# Patient Record
Sex: MALE | Race: WHITE | NOT HISPANIC OR LATINO
[De-identification: names, ages, dates, MRNs, and addresses within clinical notes are randomized per-mention and may not be internally consistent; named-entity substitution may affect disease eponyms.]

---

## 2018-07-27 PROBLEM — Z00.00 ENCOUNTER FOR PREVENTIVE HEALTH EXAMINATION: Status: ACTIVE | Noted: 2018-07-27

## 2018-07-30 ENCOUNTER — APPOINTMENT (OUTPATIENT)
Dept: CARDIOLOGY | Facility: CLINIC | Age: 58
End: 2018-07-30

## 2018-07-30 ENCOUNTER — NON-APPOINTMENT (OUTPATIENT)
Age: 58
End: 2018-07-30

## 2018-07-30 VITALS
DIASTOLIC BLOOD PRESSURE: 73 MMHG | OXYGEN SATURATION: 94 % | SYSTOLIC BLOOD PRESSURE: 115 MMHG | WEIGHT: 218 LBS | HEART RATE: 78 BPM | TEMPERATURE: 97.8 F | HEIGHT: 69 IN | RESPIRATION RATE: 12 BRPM | BODY MASS INDEX: 32.29 KG/M2

## 2018-07-30 DIAGNOSIS — G90.2 HORNER'S SYNDROME: ICD-10-CM

## 2018-07-30 DIAGNOSIS — Z82.49 FAMILY HISTORY OF ISCHEMIC HEART DISEASE AND OTHER DISEASES OF THE CIRCULATORY SYSTEM: ICD-10-CM

## 2018-07-30 DIAGNOSIS — Z87.09 PERSONAL HISTORY OF OTHER DISEASES OF THE RESPIRATORY SYSTEM: ICD-10-CM

## 2018-07-30 DIAGNOSIS — Z78.9 OTHER SPECIFIED HEALTH STATUS: ICD-10-CM

## 2018-07-30 DIAGNOSIS — Z87.438 PERSONAL HISTORY OF OTHER DISEASES OF MALE GENITAL ORGANS: ICD-10-CM

## 2018-07-30 DIAGNOSIS — G58.8 OTHER SPECIFIED MONONEUROPATHIES: ICD-10-CM

## 2018-07-30 RX ORDER — SILDENAFIL CITRATE 100 MG/1
TABLET, FILM COATED ORAL
Refills: 0 | Status: ACTIVE | COMMUNITY

## 2018-07-30 RX ORDER — ALBUTEROL SULFATE 90 UG/1
108 AEROSOL, METERED RESPIRATORY (INHALATION)
Refills: 0 | Status: ACTIVE | COMMUNITY

## 2018-08-07 ENCOUNTER — APPOINTMENT (OUTPATIENT)
Dept: CARDIOLOGY | Facility: CLINIC | Age: 58
End: 2018-08-07

## 2018-08-08 ENCOUNTER — MESSAGE (OUTPATIENT)
Age: 58
End: 2018-08-08

## 2018-08-17 ENCOUNTER — MESSAGE (OUTPATIENT)
Age: 58
End: 2018-08-17

## 2018-08-21 ENCOUNTER — APPOINTMENT (OUTPATIENT)
Dept: CARDIOLOGY | Facility: CLINIC | Age: 58
End: 2018-08-21

## 2018-09-20 ENCOUNTER — MESSAGE (OUTPATIENT)
Age: 58
End: 2018-09-20

## 2019-09-01 ENCOUNTER — HOSPITAL ENCOUNTER (EMERGENCY)
Dept: HOSPITAL 74 - JER | Age: 59
Discharge: HOME | End: 2019-09-01
Payer: COMMERCIAL

## 2019-09-01 ENCOUNTER — HOSPITAL ENCOUNTER (INPATIENT)
Dept: HOSPITAL 74 - YASAS | Age: 59
LOS: 5 days | Discharge: HOME | End: 2019-09-06
Attending: SURGERY | Admitting: SURGERY
Payer: COMMERCIAL

## 2019-09-01 VITALS — HEART RATE: 85 BPM | SYSTOLIC BLOOD PRESSURE: 136 MMHG | DIASTOLIC BLOOD PRESSURE: 77 MMHG | TEMPERATURE: 98.3 F

## 2019-09-01 VITALS — BODY MASS INDEX: 32.5 KG/M2

## 2019-09-01 DIAGNOSIS — S49.82XA: ICD-10-CM

## 2019-09-01 DIAGNOSIS — F19.24: ICD-10-CM

## 2019-09-01 DIAGNOSIS — H54.62: ICD-10-CM

## 2019-09-01 DIAGNOSIS — F10.120: Primary | ICD-10-CM

## 2019-09-01 DIAGNOSIS — J45.20: ICD-10-CM

## 2019-09-01 DIAGNOSIS — Z98.890: ICD-10-CM

## 2019-09-01 DIAGNOSIS — J45.909: ICD-10-CM

## 2019-09-01 DIAGNOSIS — Z91.19: ICD-10-CM

## 2019-09-01 DIAGNOSIS — F10.230: Primary | ICD-10-CM

## 2019-09-01 DIAGNOSIS — F13.10: ICD-10-CM

## 2019-09-01 DIAGNOSIS — F31.9: ICD-10-CM
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
## 2019-09-01 DIAGNOSIS — F17.210: ICD-10-CM

## 2019-09-01 DIAGNOSIS — J44.9: ICD-10-CM

## 2019-09-01 DIAGNOSIS — F41.9: ICD-10-CM

## 2019-09-01 DIAGNOSIS — G90.2: ICD-10-CM

## 2019-09-01 DIAGNOSIS — I10: ICD-10-CM

## 2019-09-01 DIAGNOSIS — G47.00: ICD-10-CM

## 2019-09-01 DIAGNOSIS — Z72.0: ICD-10-CM

## 2019-09-01 DIAGNOSIS — E78.00: ICD-10-CM

## 2019-09-01 LAB
ALBUMIN SERPL-MCNC: 4 G/DL (ref 3.4–5)
ALP SERPL-CCNC: 99 U/L (ref 45–117)
ALT SERPL-CCNC: 77 U/L (ref 13–61)
AMPHET UR-MCNC: NEGATIVE NG/ML
ANION GAP SERPL CALC-SCNC: 7 MMOL/L (ref 8–16)
AST SERPL-CCNC: 58 U/L (ref 15–37)
BACTERIA # UR AUTO: 1 /HPF
BARBITURATES UR-MCNC: NEGATIVE NG/ML
BASOPHILS # BLD: 0.6 % (ref 0–2)
BENZODIAZ UR SCN-MCNC: POSITIVE NG/ML
BILIRUB SERPL-MCNC: 0.4 MG/DL (ref 0.2–1)
BUN SERPL-MCNC: 18.1 MG/DL (ref 7–18)
CALCIUM SERPL-MCNC: 9.2 MG/DL (ref 8.5–10.1)
CASTS URNS QL MICRO: 5.25 /LPF (ref 0–8)
CHLORIDE SERPL-SCNC: 106 MMOL/L (ref 98–107)
CO2 SERPL-SCNC: 29 MMOL/L (ref 21–32)
COCAINE UR-MCNC: NEGATIVE NG/ML
CREAT SERPL-MCNC: 0.8 MG/DL (ref 0.55–1.3)
CRYSTALS URNS QL MICRO: (no result) /HPF
DEPRECATED RDW RBC AUTO: 14.8 % (ref 11.9–15.9)
EOSINOPHIL # BLD: 1.7 % (ref 0–4.5)
EPITH CASTS URNS QL MICRO: 1.1 /HPF
GLUCOSE SERPL-MCNC: 166 MG/DL (ref 74–106)
HCT VFR BLD CALC: 50.7 % (ref 35.4–49)
HGB BLD-MCNC: 17.2 GM/DL (ref 11.7–16.9)
LYMPHOCYTES # BLD: 17 % (ref 8–40)
MCH RBC QN AUTO: 32.2 PG (ref 25.7–33.7)
MCHC RBC AUTO-ENTMCNC: 33.9 G/DL (ref 32–35.9)
MCV RBC: 94.9 FL (ref 80–96)
METHADONE UR-MCNC: NEGATIVE NG/ML
MONOCYTES # BLD AUTO: 10.6 % (ref 3.8–10.2)
NEUTROPHILS # BLD: 70.1 % (ref 42.8–82.8)
OPIATES UR QL SCN: NEGATIVE NG/ML
PCP UR QL SCN: NEGATIVE NG/ML
PH UR: 6 [PH] (ref 5–8)
PLATELET # BLD AUTO: 201 K/MM3 (ref 134–434)
PMV BLD: 9 FL (ref 7.5–11.1)
POTASSIUM SERPLBLD-SCNC: 4.3 MMOL/L (ref 3.5–5.1)
PROT SERPL-MCNC: 7.5 G/DL (ref 6.4–8.2)
PROT UR QL STRIP: (no result)
RBC # BLD AUTO: 10.6 /HPF (ref 0–4)
RBC # BLD AUTO: 5.34 M/MM3 (ref 4–5.6)
SODIUM SERPL-SCNC: 142 MMOL/L (ref 136–145)
SP GR UR: 1.02 (ref 1.01–1.03)
UROBILINOGEN UR STRIP-MCNC: 1 MG/DL (ref 0.2–1)
WBC # BLD AUTO: 8.8 K/MM3 (ref 4–10)
WBC # UR AUTO: 0.4 /HPF (ref 0–5)

## 2019-09-01 PROCEDURE — HZ2ZZZZ DETOXIFICATION SERVICES FOR SUBSTANCE ABUSE TREATMENT: ICD-10-PCS | Performed by: ALLERGY & IMMUNOLOGY

## 2019-09-01 PROCEDURE — 3E0337Z INTRODUCTION OF ELECTROLYTIC AND WATER BALANCE SUBSTANCE INTO PERIPHERAL VEIN, PERCUTANEOUS APPROACH: ICD-10-PCS | Performed by: EMERGENCY MEDICINE

## 2019-09-01 RX ADMIN — IPRATROPIUM BROMIDE AND ALBUTEROL SULFATE PRN AMP: .5; 3 SOLUTION RESPIRATORY (INHALATION) at 23:07

## 2019-09-01 RX ADMIN — ALBUTEROL SULFATE PRN PUFF: 90 AEROSOL, METERED RESPIRATORY (INHALATION) at 22:12

## 2019-09-01 RX ADMIN — Medication SCH MG: at 22:13

## 2019-09-01 RX ADMIN — Medication PRN MG: at 22:13

## 2019-09-01 NOTE — HP
CIWA Score


Nausea/Vomitin-Mild Nausea/No Vomiting


Muscle Tremors: 2


Anxiety: 3


Agitation: 3


Paroxysmal Sweats: 1-Minimal Palms Moist


Orientation: 0-Oriented


Tacttile Disturbances: 1-Very Mild Itch/Numbness


Auditory Disturbances: 0-None


Visual Disturbances: 0-None


Headache: 2-Mild


CIWA-Ar Total Score: 13





- Admission Criteria


[facility] Guidelines: Admission for Medically Managed Detox: 


Requires at least one of the followin. CIWA greater than 12


2. Seizures within the past 24 hours


3. Delirium tremens within the past 24 hours


4. Hallucinations within the past 24 hours


5. Acute intervention needed for co  occurring medical disorder


6. Acute intervention needed for co  occurring psychiatric disorder


7. Severe withdrawal that cannot be handled at a lower level of care (continued


    vomiting, continued diarrhea, abnormal vital signs) requiring intravenous


    medication and/or fluids


8. Pregnancy








Admission ROS S





- [facility]


Chief Complaint: 





i need help to stop drinking alcohol


Allergies/Adverse Reactions: 


 Allergies











Allergy/AdvReac Type Severity Reaction Status Date / Time


 


lactose Allergy Severe  Verified 19 18:43


 


shellfish derived Allergy Severe  Verified 19 18:43











History of Present Illness: 


this 58 years old male with alcohol dependence,xanax abused ,requesting detox,

has pain in the left shoulder and limiation of movement left shoulder yesterday,


was arrested yesterday driving while intoxication,seen in [facility] er last night ,

wnt to [place] last night,reccommend to get help in detox at [facility],


seen in [facility] er this morning,medically clear to come in for detox


denied seizure


denied syncope


hypertension,


asthma


copd


blindness left eye since  post trauma


traumatic injury at age of 16,playing hockey,laceration of carotid artery,

phrenic nerve,vagus nerve,vocal cord,trachestomy 


collapse lung right


nicotine dependence 3 cigarette/day


history of manic depressive,no medication


plan for out patient program after detox








- Ebola screening


Have you traveled outside of the country in the last 21 days: No (N)


Have you had contact with anyone from an Ebola affected area: No


Do you have a fever: No





- Review of Systems


Constitutional: Loss of Appetite, Night Sweats, Changes in sleep, Weakness


EENT: reports: Nose Congestion, Other (blindness of left eye since ,post 

trauma [id number])


Respiratory: reports: Other (asthma copd)


Cardiac: reports: No Symptoms Reported


GI: reports: Diarrhea, Nausea, Abdominal cramping


: reports: No Symptoms Reported


Musculoskeletal: reports: Back Pain, Muscle Pain


Integumentary: reports: Dryness


Neuro: reports: Headache, Tremors


Endocrine: reports: No Symptoms Reported


Hematology: reports: No Symptoms Reported


Psychiatric: reports: No Sypmtoms Reported, Judgement Intact, Mood/Affect 

Appropiate, Orientated x3, other (history of manic depressive in the past)


Other Systems: Reviewed and Negative (traumatic injury of neck at age of 16)





Patient History





- Patient Medical History


Hx Anemia: No


Hx Asthma: Yes (albuterol inhaler)


Hx Chronic Obstructive Pulmonary Disease (COPD): Yes (albuterol inhaler)


Hx Cancer: No


Hx Cardiac Disorders: No


Hx Hypertension: Yes (no med)


Hx Hypercholesterolemia: Yes (on med)


Hx Pacemaker: No


HX Cerebrovascular Accident: No


Hx Seizures: No


Hx Dementia: No


Hx Diabetes: No


Hx Gastrointestinal Disorders: No


Hx Liver Disease: No


Hx Genitourinary Disorders: No


Hx Sexually Transmitted Disorders: No


Hx Renal Disease (ESRD): No


Hx Thyroid Disease: No


Hx Human Immunodeficiency Virus (HIV): No (last  negative)


Hx Hepatitis C: No


Hx Depression: Yes (no med)


Hx Suicide Attempt: Yes (overdose at age 18)


Hx Bipolar Disorder: No


Hx Schizophrenia: Yes (manic depressive)


Other Medical History: n suicidal,no homicidal,pain in lreft shoulder since 

last night





- Patient Surgical History


Past Surgical History: Yes


Hx Neurologic Surgery: No


Hx Cataract Extraction: No


Hx Cardiac Surgery: No


Hx Lung Surgery: Yes (collpase right lung requiring chest tube)


Hx Breast Surgery: No


Hx Breast Biopsy: No


Hx Abdominal Surgery: No


Hx Appendectomy: No


Hx Cholecystectomy: No


Hx Genitourinary Surgery: No


Hx  Section: No


Hx Orthopedic Surgery: No


Other Surgical History: surgery for lacertion of carotid artery right,phrnic n,

vagal n,vocal cord,t


Anesthesia Reaction: No





- PPD History


Previous Implant?: Yes


Documented Results: Negative w/o proof


Implanted On Prior R Admission?: No


PPD to be Administered?: No





- Smoking Cessation


Smoking history: Current some day smoker


Have you smoked in the past 12 months: Yes


Aproximately how many cigarettes per day: 3


Hx Chewing Tobacco Use: Yes


Initiated information on smoking cessation: Yes


'Breaking Loose' booklet given: 19





- Substance & Tx. History


Hx Alcohol Use: Yes


Hx Substance Use: Yes


Substance Use Type: Alcohol, Tranquilizers


Hx Substance Use Treatment: No





- Substances abused


  ** Alcohol


Substance route: Oral


Frequency: Daily


Amount used: liquor-1 pint, beer- 4 (24oz)


Age of first use: 38


Date of last use: 19





  ** Alprazolam (Xanax)


Substance route: Oral


Frequency: Daily


Amount used: 5mg


Age of first use: 58


Date of last use: 19





Family Disease History





- Family Disease History


Family History: Denies





Admission Physical Exam [facility]





- Vital Signs


Vital Signs: 


 Vital Signs - 24 hr











  19





  18:50


 


Temperature 96.3 F L


 


Pulse Rate 86


 


Respiratory 18





Rate 


 


Blood Pressure 162/97














- Physical


General Appearance: Yes: Moderate Distress, Tremorous, Irritable, Anxious


HEENTM: Yes: Nasal Congestion, Other (blindness of left eye dilated pupil right 

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


Respiratory: Yes: Lungs Clear, Normal Breath Sounds, No Respiratory Distress, 

Surgical Scar (asthma copd)


Neck: Yes: Supple, Other (s/p tracheostomy scar scar of right neck)


Breast: Yes: Within Normal Limits


Cardiology: Yes: Within Normal Limits, Regular Rhythm, Regular Rate, S1, S2


Abdominal: Yes: Within Normal Limits, Normal Bowel Sounds, Non Tender, Flat, 

Soft


Genitourinary: Yes: Within Normal Limits


Back: Yes: Muscle Spasm


Musculoskeletal: Yes: Back pain, Muscle Pain


Extremities: Yes: Tremors, Other (painin the left shoulder with severe 

limitation of left abduction)


Neurological: Yes: CNs II-XII NML intact, Alert, Motor Strength 5/5


Integumentary: Yes: Dry


Lymphatic: Yes: Within Normal Limits





- Diagnostic


(1) Alcohol dependence with uncomplicated withdrawal


Current Visit: Yes   Status: Acute   





(2) Benzodiazepine abuse


Current Visit: Yes   Status: Acute   





(3) Hypertension


Current Visit: Yes   Status: Acute   





(4) Asthma


Current Visit: Yes   Status: Acute   





(5) COPD (chronic obstructive pulmonary disease)


Current Visit: Yes   Status: Acute   





(6) Blindness of left eye


Current Visit: Yes   Status: Acute   





(7) Crush injury, neck


Current Visit: Yes   Status: Acute   





(8) Joel's syndrome


Current Visit: Yes   Status: Acute   





(9) Injury of left shoulder


Current Visit: Yes   Status: Acute   





(10) History of manic depressive disorder


Current Visit: Yes   Status: Acute   





(11) History of drug overdose


Current Visit: Yes   Status: Acute   





(12) History of tracheostomy


Current Visit: Yes   Status: Acute   





(13) H/O chest tube placement


Current Visit: Yes   Status: Acute   





Cleared for Admission S





- Detox or Rehab


[facility] Level of Care: Medically Managed


Detox Regimen/Protocol: Librium





Breathalyzer





- Breathalyzer


Breathalyzer: 0





Urine Drug Screen





- Test Device


Lot number: [id number]


Expiration date: 21





- Control


Is test valid?: Yes





- Results


Drug screen NEGATIVE: No


Urine drug screen results: BZO-Benzodiazepines





Inpatient Rehab Admission





- Rehab Decision to Admit


Inpatient rehab admission?: No

## 2019-09-01 NOTE — PDOC
History of Present Illness





- General


Chief Complaint: Alcohol intoxication


Stated Complaint: DETOX


Time Seen by Provider: 09/01/19 14:16


History Source: Patient





Past History





- Past Medical History


Allergies/Adverse Reactions: 


 Allergies











Allergy/AdvReac Type Severity Reaction Status Date / Time


 


No Known Allergies Allergy   Verified 09/01/19 14:14











Asthma: Yes


COPD: No


HTN: Yes


Hypercholesterolemia: Yes


Psychiatric Problems: Yes (anxiety)


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





- Suicide/Smoking/Psychosocial Hx


Smoking History: Current some day smoker


Information on smoking cessation initiated: No





**Review of Systems





- Review of Systems


Constitutional: No: Chills, Fever


Respiratory: No: Cough, Shortness of Breath


Cardiac (ROS): No: Chest Pain, Lightheadedness


ABD/GI: No: Constipated, Diarrhea, Nausea, Rectal Bleeding, Vomiting, Abdominal 

cramping, Tarry Stools


: No: Burning, Dysuria, Flank Pain, Hematuria


Neurological: No: Headache, Dizziness





*Physical Exam





- Vital Signs


 Last Vital Signs











Temp Pulse Resp BP Pulse Ox


 


 98.1 F   91 H  18   124/72   9 L


 


 09/01/19 14:08  09/01/19 14:08  09/01/19 14:08  09/01/19 14:08  09/01/19 14:08














- Physical Exam


General Appearance: Yes: Appropriately Dressed.  No: Apparent Distress


HEENT: positive: Normal Voice


Neck: positive: Supple


Respiratory/Chest: positive: Lungs Clear, Normal Breath Sounds.  negative: 

Respiratory Distress


Cardiovascular: positive: Regular Rate, S1, S2


Gastrointestinal/Abdominal: positive: Soft.  negative: Tender


Extremity: positive: Normal Inspection


Integumentary: positive: Dry, Warm


Neurologic: positive: Fully Oriented, Alert, Normal Mood/Affect, Other (no 

tremors/asterixis)





ED Treatment Course





- LABORATORY


CBC & Chemistry Diagram: 


 09/01/19 14:42





 09/01/19 14:42





Medical Decision Making





- Medical Decision Making





09/01/19 15:03


57 yo M, asthma, bipolar, took himself off meds >15 years ago, lost to f/u, 

ETOH abuse, here requesting detox. Pt states he got arrested for DWI yesterday 

and now wants to get help with his addiction. No inpt detox/rehab in the past. 

Pt also admits to benzo use in past that was not prescribed for him. No SI/HI. 

No acute med complaints at this time except for mild L shoulder ache that 

started after altercation w/ police last night. Denies head injury. last ETOH 

intake was >10 hrs ago. 





See exam





ETOH abuse req detox


No acute med complaints


Stable w/ no obvious e/o intoxication and no e/o withdrawal at this time


-med clearance


-librium prophylactically


-c/w w/ admitting at [facility] detox





09/01/19 15:08


Case d/w Dr Gaytan at [facility] who wants pt transferred over after med clearance








09/01/19 15:58


ETOH lvl 144, utox + for benzos, labs otherwise unremarkable. Pt remained 

stable and well agustín here. Rpt vitals wnl. Will transfer to [facility] at this time








*DC/Admit/Observation/Transfer


Diagnosis at time of Disposition: 


 ETOH abuse








- Discharge Dispostion


Disposition: HOME


Condition at time of disposition: Stable





- Referrals


Referrals: 


Chucky Haro MD [Primary Care Provider] - 





- Patient Instructions


Printed Discharge Instructions:  DI for Alcohol Abuse


Additional Instructions: 


You were transferred to [facility] for inpatient detox at this time





- Post Discharge Activity

## 2019-09-01 NOTE — PDOC
History of Present Illness





- General


Chief Complaint: Alcohol intoxication


Stated Complaint: DETOX


Time Seen by Provider: 09/01/19 14:16





Past History





- Past Medical History


Allergies/Adverse Reactions: 


 Allergies











Allergy/AdvReac Type Severity Reaction Status Date / Time


 


No Known Allergies Allergy   Verified 09/01/19 14:14











Asthma: Yes


COPD: No


HTN: Yes


Hypercholesterolemia: Yes


Psychiatric Problems: Yes (anxiety)


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





- Suicide/Smoking/Psychosocial Hx


Smoking History: Current some day smoker


Information on smoking cessation initiated: No





*Physical Exam





- Vital Signs


 Last Vital Signs











Temp Pulse Resp BP Pulse Ox


 


 98.1 F   91 H  18   124/72   9 L


 


 09/01/19 14:08  09/01/19 14:08  09/01/19 14:08  09/01/19 14:08  09/01/19 14:08














ED Treatment Course





- RADIOLOGY


Radiology Studies Ordered: 














 Category Date Time Status


 


 CHEST PA & LAT [RAD] Stat Radiology  09/01/19 14:19 Ordered














Medical Decision Making





- Medical Decision Making





09/01/19 14:23


57 yo M, asthma, bipolar, stopped meds in 2001, ETOH abuse, here requesting

## 2019-09-02 LAB
ALBUMIN SERPL-MCNC: 3.8 G/DL (ref 3.4–5)
ALP SERPL-CCNC: 92 U/L (ref 45–117)
ALT SERPL-CCNC: 62 U/L (ref 13–61)
ANION GAP SERPL CALC-SCNC: 5 MMOL/L (ref 8–16)
APPEARANCE UR: CLEAR
AST SERPL-CCNC: 41 U/L (ref 15–37)
BILIRUB SERPL-MCNC: 0.8 MG/DL (ref 0.2–1)
BILIRUB UR STRIP.AUTO-MCNC: NEGATIVE MG/DL
BUN SERPL-MCNC: 12.9 MG/DL (ref 7–18)
CALCIUM SERPL-MCNC: 9.3 MG/DL (ref 8.5–10.1)
CHLORIDE SERPL-SCNC: 102 MMOL/L (ref 98–107)
CO2 SERPL-SCNC: 34 MMOL/L (ref 21–32)
COLOR UR: YELLOW
CREAT SERPL-MCNC: 0.8 MG/DL (ref 0.55–1.3)
DEPRECATED RDW RBC AUTO: 14.4 % (ref 11.9–15.9)
GLUCOSE SERPL-MCNC: 109 MG/DL (ref 74–106)
HCT VFR BLD CALC: 48.9 % (ref 35.4–49)
HGB BLD-MCNC: 16.5 GM/DL (ref 11.7–16.9)
KETONES UR QL STRIP: NEGATIVE
LEUKOCYTE ESTERASE UR QL STRIP.AUTO: NEGATIVE
MCH RBC QN AUTO: 32.1 PG (ref 25.7–33.7)
MCHC RBC AUTO-ENTMCNC: 33.7 G/DL (ref 32–35.9)
MCV RBC: 95 FL (ref 80–96)
NITRITE UR QL STRIP: NEGATIVE
PH UR: 7 [PH] (ref 5–8)
PLATELET # BLD AUTO: 172 K/MM3 (ref 134–434)
PMV BLD: 9.3 FL (ref 7.5–11.1)
POTASSIUM SERPLBLD-SCNC: 3.8 MMOL/L (ref 3.5–5.1)
PROT SERPL-MCNC: 7 G/DL (ref 6.4–8.2)
PROT UR QL STRIP: NEGATIVE
PROT UR QL STRIP: NEGATIVE
RBC # BLD AUTO: 5.14 M/MM3 (ref 4–5.6)
SODIUM SERPL-SCNC: 141 MMOL/L (ref 136–145)
SP GR UR: 1.02 (ref 1.01–1.03)
UROBILINOGEN UR STRIP-MCNC: 1 MG/DL (ref 0.2–1)
WBC # BLD AUTO: 8 K/MM3 (ref 4–10)

## 2019-09-02 RX ADMIN — FUROSEMIDE SCH MG: 20 TABLET ORAL at 10:19

## 2019-09-02 RX ADMIN — ALBUTEROL SULFATE PRN PUFF: 90 AEROSOL, METERED RESPIRATORY (INHALATION) at 14:23

## 2019-09-02 RX ADMIN — Medication SCH TAB: at 10:19

## 2019-09-02 RX ADMIN — IPRATROPIUM BROMIDE AND ALBUTEROL SULFATE PRN AMP: .5; 3 SOLUTION RESPIRATORY (INHALATION) at 22:17

## 2019-09-02 RX ADMIN — IPRATROPIUM BROMIDE AND ALBUTEROL SULFATE PRN AMP: .5; 3 SOLUTION RESPIRATORY (INHALATION) at 09:50

## 2019-09-02 RX ADMIN — ATORVASTATIN CALCIUM SCH MG: 20 TABLET, FILM COATED ORAL at 22:08

## 2019-09-02 RX ADMIN — Medication PRN MG: at 22:08

## 2019-09-02 RX ADMIN — Medication SCH MG: at 22:08

## 2019-09-02 NOTE — PN
S CIWA





- CIWA Score


Nausea/Vomitin


Muscle Tremors: 2


Anxiety: 3


Agitation: 3


Paroxysmal Sweats: No Perspiration


Orientation: 0-Oriented


Tacttile Disturbances: 1-Very Mild Itch/Numbness


Auditory Disturbances: 0-None


Visual Disturbances: 0-None


Headache: 2-Mild


CIWA-Ar Total Score: 13





[facility] Progress Note (SOAP)


Subjective: 





alert,irritable,anxious,interrupted sleep,tremor,pain in the body,left shoulder


Objective: 





19 11:05


 Vital Signs











Temperature  98.2 F   19 10:00


 


Pulse Rate  90   19 10:00


 


Respiratory Rate  17   19 10:00


 


Blood Pressure  147/88   19 10:00


 


O2 Sat by Pulse Oximetry (%)      








 Laboratory Last Values











WBC  8.0 K/mm3 (4.0-10.0)   19  07:30    


 


RBC  5.14 M/mm3 (4.00-5.60)   19  07:30    


 


Hgb  16.5 GM/dL (11.7-16.9)   19  07:30    


 


Hct  48.9 % (35.4-49)   19  07:30    


 


MCV  95.0 fl (80-96)   19  07:30    


 


MCH  32.1 pg (25.7-33.7)   19  07:30    


 


MCHC  33.7 g/dl (32.0-35.9)   19  07:30    


 


RDW  14.4 % (11.9-15.9)   19  07:30    


 


Plt Count  172 K/MM3 (134-434)   19  07:30    


 


MPV  9.3 fl (7.5-11.1)   19  07:30    


 


Sodium  141 mmol/L (136-145)   19  07:30    


 


Potassium  3.8 mmol/L (3.5-5.1)   19  07:30    


 


Chloride  102 mmol/L ()   19  07:30    


 


Carbon Dioxide  34 mmol/L (21-32)  H  19  07:30    


 


Anion Gap  5 MMOL/L (8-16)  L  19  07:30    


 


BUN  12.9 mg/dL (7-18)   19  07:30    


 


Creatinine  0.8 mg/dL (0.55-1.3)   19  07:30    


 


Est GFR (CKD-EPI)AfAm  112.53   19  07:30    


 


Est GFR (CKD-EPI)NonAf  97.10   19  07:30    


 


Random Glucose  109 mg/dL ()  H  19  07:30    


 


Calcium  9.3 mg/dL (8.5-10.1)   19  07:30    


 


Total Bilirubin  0.8 mg/dL (0.2-1)   19  07:30    


 


AST  41 U/L (15-37)  H  19  07:30    


 


ALT  62 U/L (13-61)  H  19  07:30    


 


Alkaline Phosphatase  92 U/L ()   19  07:30    


 


Total Protein  7.0 g/dl (6.4-8.2)   19  07:30    


 


Albumin  3.8 g/dl (3.4-5.0)   19  07:30    


 


Urine Color  Yellow   19  07:30    


 


Urine Appearance  Clear   19  07:30    


 


Urine pH  7.0  (5.0-8.0)   19  07:30    


 


Ur Specific Gravity  1.017  (1.010-1.035)   19  07:30    


 


Urine Protein  Negative  (NEGATIVE)   19  07:30    


 


Urine Glucose (UA)  Negative  (NEGATIVE)   19  07:30    


 


Urine Ketones  Negative  (NEGATIVE)   19  07:30    


 


Urine Blood  Negative  (NEGATIVE)   19  07:30    


 


Urine Nitrite  Negative  (NEGATIVE)   19  07:30    


 


Urine Bilirubin  Negative  (NEGATIVE)   19  07:30    


 


Urine Urobilinogen  1.0 mg/dL (0.2-1.0)   19  07:30    


 


Ur Leukocyte Esterase  Negative  (NEGATIVE)   19  07:30    


 


RPR Titer  Nonreactive  (NONREACTIVE)   19  07:30    


 


HIV 1&2 Antibody Screen  Cancelled   19  07:30    


 


HIV P24 Antigen  Cancelled   19  07:30    











Assessment: 





19 11:07


withdrawal symptom


Plan: 





continue detox librium regimen

## 2019-09-02 NOTE — EKG
Test Reason : 

Blood Pressure : ***/*** mmHG

Vent. Rate : 086 BPM     Atrial Rate : 086 BPM

   P-R Int : 176 ms          QRS Dur : 156 ms

    QT Int : 398 ms       P-R-T Axes : 028 -25 053 degrees

   QTc Int : 476 ms

 

NORMAL SINUS RHYTHM

RIGHT BUNDLE BRANCH BLOCK

SEPTAL INFARCT , AGE UNDETERMINED

ABNORMAL ECG

NO PREVIOUS ECGS AVAILABLE

Confirmed by MILLER MOORE MD ([id number]) on 9/2/2019 9:15:54 AM

 

Referred By:             Confirmed By:MILLER MOORE MD

## 2019-09-02 NOTE — CONSULT
[facility] Psychiatric Consult





- Data


Date of interview: 09/02/19


Admission source: [facility]


Identifying data: First admission to [facility] for this 57 y/o  male 

self-referred for detoxification (alcohol) after his arrest, last night, for 

allegedly driving under the influence of alcohol (DWI). Examined at bedside, on 

[address]. Patient is , a father of one, domiciled and self-employed (

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


Substance Abuse History: Discussed in this session. Mr Holden admits to heavy 

dependence on alcohol and using xanax (obtained from a relative) without 

prescriptions. Details in current [facility] report as follows : Smoking history: 

Current some day smoker.  Have you smoked in the past 12 months: Yes.  

Aproximately how many cigarettes per day: 3.  Hx Chewing Tobacco Use: Yes.  

Initiated information on smoking cessation: Yes.  'Breaking Loose' booklet given

: 09/01/19.  - Substance & Tx. History.  Hx Alcohol Use: Yes.  Hx Substance Use

: Yes.  Substance Use Type: Alcohol, Tranquilizers.  Hx Substance Use Treatment

: No.  - Substances abused.  ** Alcohol.  Substance route: Oral.  Frequency: 

Daily.  Amount used: liquor-1 pint, beer- 4 (24oz).  Age of first use: 38.  

Date of last use: 09/01/19.  ** Alprazolam (Xanax).  Substance route: Oral.  

Frequency: Daily.  Amount used: 5mg.  Age of first use: 58.  Date of last use: 

08/30/19


Medical History: Medical profile is remarkable for obesity, dyslipidemia, COPD, 

bronchial asthma, hypertension, sleep apnea, blindness in left eye (traumatic 

injury in 1995) and a distant history of surgery for severe injuries, at age 16

, sustained while playing hockey (laceration of carotid artery, trauma to 

phrenic nerve, vagus nerve, vocal cords, Joel's syndrome). History of 

tracheostomy.


Psychiatric History: Patient reports a history of psychiatric hospitalizations 

" in the 1980's ". He recalls a past commitment to [facility] in 

[place] in 1990 for " a serious situation " with his wife (patient declines 

to elaborate). Mr Holden indicates that he got diagnosed with " manic-

depressive " illness for which he received treatment with lithium. Patient 

states that he has stopped taking that medication for several years and last 

saw a psychiatrist around 2001. He endorses one suicide attempt, in 1986, via 

overdose with quaaludes.


Physical/Sexual Abuse/Trauma History: Patient denies history of abuse.


Additional Comment: Urine drug screen results: BZO-Benzodiazepines. Noted.





Mental Status Exam





- Mental Status Exam


Alert and Oriented to: Time, Place, Person


Cognitive Function: Good


Patient Appearance: Disheveled (appears stated age, obese)


Mood: Nervous, Withdrawn, Anxious


Affect: Mood Congruent, Constricted


Patient Behavior: Fatigued, Appropriate, Cooperative


Speech Pattern: Clear, Appropriate


Voice Loudness: Normal


Thought Process: Goal Oriented


Thought Disorder: Not Present


Hallucinations: Denies


Suicidal Ideation: Denies


Homicidal Ideation: Denies


Insight/Judgement: Poor


Sleep: Poorly, Difficulty falling asleep


Appetite: Good


Gait/Station: Normal





Psychiatric Findings





- Problem List (Axis 1, 2,3)


(1) Alcohol dependence with uncomplicated withdrawal


Current Visit: Yes   Status: Acute   





(2) Benzodiazepine abuse


Current Visit: Yes   Status: Chronic   





(3) Nicotine dependence


Current Visit: Yes   Status: Chronic   





(4) Substance induced mood disorder


Current Visit: Yes   Status: Chronic   





(5) History of bipolar disorder


Current Visit: Yes   Status: Chronic   Comment: No-adherent to medications + 

OPD care for several years.   





(6) Insomnia


Current Visit: Yes   Status: Chronic   





(7) Non-compliance


Current Visit: Yes   Status: Chronic   





- Initial Treatment Plan


Initial Treatment Plan: Psychoeducation. Sleep hygiene. Support. 

Detoxification. Relapse prevention (MAT) : discussed with patient. Insomnia is 

addressed with melatonin at bedtime. Side effects/benefits reviewed with 

patient. Mr Holden expresses his agreement with this plan of care. Observation.

## 2019-09-02 NOTE — EKG
Test Reason : 

Blood Pressure : ***/*** mmHG

Vent. Rate : 091 BPM     Atrial Rate : 091 BPM

   P-R Int : 176 ms          QRS Dur : 152 ms

    QT Int : 398 ms       P-R-T Axes : 040 -17 058 degrees

   QTc Int : 489 ms

 

NORMAL SINUS RHYTHM

RIGHT BUNDLE BRANCH BLOCK

SEPTAL INFARCT , AGE UNDETERMINED

ABNORMAL ECG

NO PREVIOUS ECGS AVAILABLE

Confirmed by MILLER MOORE MD ([id number]) on 9/2/2019 9:07:53 AM

 

Referred By: SATURNINO WONG           Confirmed By:MILLER MOORE MD

## 2019-09-03 RX ADMIN — ATORVASTATIN CALCIUM SCH MG: 20 TABLET, FILM COATED ORAL at 22:20

## 2019-09-03 RX ADMIN — IPRATROPIUM BROMIDE AND ALBUTEROL SULFATE PRN AMP: .5; 3 SOLUTION RESPIRATORY (INHALATION) at 22:24

## 2019-09-03 RX ADMIN — FUROSEMIDE SCH MG: 20 TABLET ORAL at 10:23

## 2019-09-03 RX ADMIN — Medication SCH TAB: at 10:23

## 2019-09-03 RX ADMIN — TRAZODONE HYDROCHLORIDE SCH MG: 100 TABLET ORAL at 22:21

## 2019-09-03 RX ADMIN — IBUPROFEN PRN MG: 400 TABLET, FILM COATED ORAL at 17:24

## 2019-09-03 RX ADMIN — IBUPROFEN PRN MG: 400 TABLET, FILM COATED ORAL at 10:26

## 2019-09-03 RX ADMIN — Medication SCH MG: at 22:20

## 2019-09-03 NOTE — PN
[facility] Progress Note


Note: 





Patient reports sleeping poorly despite taking Melatonin 5 mg/hs. Requests to 

be ordered Trazadone 100 g/hs to which he responded favorably in the past

## 2019-09-03 NOTE — PN
[facility]HS CIWA





- CIWA Score


Nausea/Vomitin-Mild Nausea/No Vomiting


Muscle Tremors: 1-None Visible, but Felt


Anxiety: 2


Agitation: 3


Paroxysmal Sweats: No Perspiration


Orientation: 0-Oriented


Tacttile Disturbances: 1-Very Mild Itch/Numbness


Auditory Disturbances: 0-None


Visual Disturbances: 0-None


Headache: 2-Mild


CIWA-Ar Total Score: 10





BHS Progress Note (SOAP)


Subjective: 





alert,irritable,anxious,interrupted sleep,pain in the body


Objective: 





19 09:34


 Initial Vital Signs











Temp Pulse Resp BP


 


 96.3 F L  86   18   162/97 


 


 19 18:50  19 18:50  19 18:50  19 18:50











Assessment: 





19 09:35


withdrawal symptom


Plan: 





continue detox librium regimen

## 2019-09-03 NOTE — PN
[facility] Progress Note


Note: 





pychiatric ecluation for insomnia,history of depression in the past,requesting 

for medication

## 2019-09-04 RX ADMIN — IPRATROPIUM BROMIDE AND ALBUTEROL SULFATE PRN AMP: .5; 3 SOLUTION RESPIRATORY (INHALATION) at 20:03

## 2019-09-04 RX ADMIN — Medication SCH MG: at 22:17

## 2019-09-04 RX ADMIN — FUROSEMIDE SCH MG: 20 TABLET ORAL at 10:11

## 2019-09-04 RX ADMIN — TRAZODONE HYDROCHLORIDE SCH MG: 100 TABLET ORAL at 22:17

## 2019-09-04 RX ADMIN — ALBUTEROL SULFATE PRN PUFF: 90 AEROSOL, METERED RESPIRATORY (INHALATION) at 03:58

## 2019-09-04 RX ADMIN — ATORVASTATIN CALCIUM SCH MG: 20 TABLET, FILM COATED ORAL at 22:17

## 2019-09-04 RX ADMIN — Medication SCH TAB: at 10:11

## 2019-09-04 NOTE — PN
S CIWA





- CIWA Score


Nausea/Vomitin-Mild Nausea/No Vomiting


Muscle Tremors: 1-None Visible, but Felt


Anxiety: 2


Agitation: 2


Paroxysmal Sweats: No Perspiration


Orientation: 0-Oriented


Tacttile Disturbances: 1-Very Mild Itch/Numbness


Auditory Disturbances: 0-None


Visual Disturbances: 0-None


Headache: 1-Very Mild


CIWA-Ar Total Score: 8





BHS Progress Note (SOAP)


Subjective: 





alert,irritable,anxious,interrupted sleep,mild wheezing


Objective: 





19 09:59


 Vital Signs











Temperature  97.2 F L  19 09:06


 


Pulse Rate  88   19 09:06


 


Respiratory Rate  17   19 09:06


 


Blood Pressure  129/74   19 09:06


 


O2 Sat by Pulse Oximetry (%)      











Assessment: 





19 10:00


withdrawal symptom but less


Plan: 





continue detox librium regimen,continue albuterol inhaler,duoneb nebulizer,

close monitoring,


discharge in am

## 2019-09-05 RX ADMIN — IBUPROFEN PRN MG: 400 TABLET, FILM COATED ORAL at 17:26

## 2019-09-05 RX ADMIN — ALBUTEROL SULFATE PRN PUFF: 90 AEROSOL, METERED RESPIRATORY (INHALATION) at 17:29

## 2019-09-05 RX ADMIN — BISMUTH SUBSALICYLATE PRN MG: 525 LIQUID ORAL at 17:27

## 2019-09-05 RX ADMIN — IBUPROFEN PRN MG: 400 TABLET, FILM COATED ORAL at 09:13

## 2019-09-05 RX ADMIN — Medication SCH TAB: at 10:31

## 2019-09-05 RX ADMIN — Medication SCH MG: at 22:05

## 2019-09-05 RX ADMIN — TRAZODONE HYDROCHLORIDE SCH MG: 100 TABLET ORAL at 22:05

## 2019-09-05 RX ADMIN — BISMUTH SUBSALICYLATE PRN MG: 525 LIQUID ORAL at 22:07

## 2019-09-05 RX ADMIN — ATORVASTATIN CALCIUM SCH MG: 20 TABLET, FILM COATED ORAL at 22:05

## 2019-09-05 RX ADMIN — FUROSEMIDE SCH MG: 20 TABLET ORAL at 10:31

## 2019-09-05 NOTE — PN
[facility] Progress Note


Note: 


 Laboratory Results - last 24 hr











  09/02/19





  09:00


 


HIV 1&2 Ag/Ab, 4th Gen  Non-reactive

## 2019-09-05 NOTE — PN
[facility] CIWA





- CIWA Score


Nausea/Vomitin-Mild Nausea/No Vomiting


Muscle Tremors: 1-None Visible, but Felt


Anxiety: 2


Agitation: 2


Paroxysmal Sweats: No Perspiration


Orientation: 0-Oriented


Tacttile Disturbances: 0-None


Auditory Disturbances: 0-None


Visual Disturbances: 0-None


Headache: 1-Very Mild


CIWA-Ar Total Score: 7





B[facility] Progress Note (SOAP)


Subjective: 





alert,irritable,interrupted sleep,mild expiratory wheezing


Objective: 





19 09:02


 Vital Signs











Temperature  97.5 F L  19 07:08


 


Pulse Rate  77   19 07:08


 


Respiratory Rate  18   19 07:08


 


Blood Pressure  141/91   19 07:08


 


O2 Sat by Pulse Oximetry (%)      











Assessment: 





19 09:02


withdrawal symptom


Plan: 





continue detox librium regimen,discharge in am

## 2019-09-06 VITALS — HEART RATE: 82 BPM | TEMPERATURE: 97.2 F | DIASTOLIC BLOOD PRESSURE: 81 MMHG | SYSTOLIC BLOOD PRESSURE: 134 MMHG

## 2019-09-06 RX ADMIN — Medication SCH TAB: at 09:07

## 2019-09-06 RX ADMIN — ALBUTEROL SULFATE PRN PUFF: 90 AEROSOL, METERED RESPIRATORY (INHALATION) at 09:07

## 2019-09-06 RX ADMIN — FUROSEMIDE SCH MG: 20 TABLET ORAL at 09:07

## 2019-09-06 RX ADMIN — IBUPROFEN PRN MG: 400 TABLET, FILM COATED ORAL at 06:02

## 2019-09-06 NOTE — DS
[facility] Detox Discharge Summary


Admission Date: 


09/01/19





Discharge Date: 09/06/19





- History


Present History: Alcohol Dependence, Sedative Dependence





- Physical Exam Results


Vital Signs: 


 Vital Signs











Temperature  97.2 F L  09/06/19 06:54


 


Pulse Rate  82   09/06/19 06:54


 


Respiratory Rate  18   09/06/19 06:54


 


Blood Pressure  134/81   09/06/19 06:54


 


O2 Sat by Pulse Oximetry (%)      











Pertinent Admission Physical Exam Findings: 





pt arrived in withdrawals


 Laboratory Tests











  09/02/19 09/02/19 09/02/19





  07:30 07:30 07:30


 


WBC  8.0  


 


RBC  5.14  


 


Hgb  16.5  


 


Hct  48.9  


 


MCV  95.0  


 


MCH  32.1  


 


MCHC  33.7  


 


RDW  14.4  


 


Plt Count  172  


 


MPV  9.3  


 


Sodium   141 


 


Potassium   3.8 


 


Chloride   102 


 


Carbon Dioxide   34 H 


 


Anion Gap   5 L 


 


BUN   12.9 


 


Creatinine   0.8 


 


Est GFR (CKD-EPI)AfAm   112.53 


 


Est GFR (CKD-EPI)NonAf   97.10 


 


Random Glucose   109 H 


 


Calcium   9.3 


 


Total Bilirubin   0.8 


 


AST   41 H 


 


ALT   62 H 


 


Alkaline Phosphatase   92 


 


Total Protein   7.0 


 


Albumin   3.8 


 


Urine Color   


 


Urine Appearance   


 


Urine pH   


 


Ur Specific Gravity   


 


Urine Protein   


 


Urine Glucose (UA)   


 


Urine Ketones   


 


Urine Blood   


 


Urine Nitrite   


 


Urine Bilirubin   


 


Urine Urobilinogen   


 


Ur Leukocyte Esterase   


 


RPR Titer    Nonreactive


 


HIV 1&2 Ag/Ab, 4th Gen   


 


HIV 1&2 Antibody Screen   


 


HIV P24 Antigen   














  09/02/19 09/02/19 09/02/19





  07:30 07:30 09:00


 


WBC   


 


RBC   


 


Hgb   


 


Hct   


 


MCV   


 


MCH   


 


MCHC   


 


RDW   


 


Plt Count   


 


MPV   


 


Sodium   


 


Potassium   


 


Chloride   


 


Carbon Dioxide   


 


Anion Gap   


 


BUN   


 


Creatinine   


 


Est GFR (CKD-EPI)AfAm   


 


Est GFR (CKD-EPI)NonAf   


 


Random Glucose   


 


Calcium   


 


Total Bilirubin   


 


AST   


 


ALT   


 


Alkaline Phosphatase   


 


Total Protein   


 


Albumin   


 


Urine Color   Yellow 


 


Urine Appearance   Clear 


 


Urine pH   7.0 


 


Ur Specific Gravity   1.017 


 


Urine Protein   Negative 


 


Urine Glucose (UA)   Negative 


 


Urine Ketones   Negative 


 


Urine Blood   Negative 


 


Urine Nitrite   Negative 


 


Urine Bilirubin   Negative 


 


Urine Urobilinogen   1.0 


 


Ur Leukocyte Esterase   Negative 


 


RPR Titer   


 


HIV 1&2 Ag/Ab, 4th Gen    Non-reactive


 


HIV 1&2 Antibody Screen  Cancelled  


 


HIV P24 Antigen  Cancelled  








today pt is aaox3


ambulating


no acute distress


no s/s of withdrawals





- Treatment


Hospital Course: Detox Protocol Followed, Detoxed Safely, Responded well, 

Discharged Condition Good, Rehab Referral Accepted


Patient has Accepted a Rehab Referral to: pt declined rehab; referral provided





- Medication


Discharge Medications: 


Ambulatory Orders





Albuterol Sulfate Inhaler - [Ventolin Hfa Inhaler -] 1 - 2 inh PO QID 09/01/19 


Albuterol Sulfate [Proair Hfa] 8.5 gm IH PRN PRN 09/01/19 


Atorvastatin Ca [Lipitor] 20 mg PO DAILY 09/01/19 


Furosemide [Lasix -] 20 mg PO DAILY 09/01/19 











- Diagnosis


(1) Alcohol dependence with uncomplicated withdrawal


Current Visit: Yes   Status: Chronic   





(2) Asthma


Current Visit: Yes   Status: Chronic   


Qualifiers: 


   Asthma severity: mild   Asthma complication type: unspecified 





(3) Blindness of left eye


Current Visit: Yes   Status: Acute   





(4) COPD (chronic obstructive pulmonary disease)


Current Visit: Yes   Status: Acute   


Qualifiers: 


   Emphysema type: unspecified 





(5) Crush injury, neck


Current Visit: No   Status: Acute   


Qualifiers: 


   Encounter type: sequela   Qualified Code(s): S17.9XXS - Crushing injury of 

neck, part unspecified, sequela   





(6) H/O chest tube placement


Current Visit: Yes   Status: Acute   





(7) History of drug overdose


Current Visit: Yes   Status: Acute   





(8) History of manic depressive disorder


Current Visit: Yes   Status: Acute   





(9) History of tracheostomy


Current Visit: No   Status: Chronic   





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


Current Visit: No   Status: Chronic   





(11) Hypertension


Current Visit: Yes   Status: Chronic   


Qualifiers: 


   Hypertension type: unspecified   Qualified Code(s): I10 - Essential (primary

) hypertension   





(12) Injury of left shoulder


Current Visit: Yes   Status: Acute   





(13) Benzodiazepine abuse


Current Visit: Yes   Status: Chronic   





(14) History of bipolar disorder


Current Visit: Yes   Status: Chronic   





(15) Insomnia


Current Visit: Yes   Status: Chronic   





(16) Nicotine dependence


Current Visit: Yes   Status: Chronic   


Qualifiers: 


   Nicotine product type: cigarettes   Substance use status: uncomplicated   

Qualified Code(s): F17.210 - Nicotine dependence, cigarettes, uncomplicated   





(17) Non-compliance


Current Visit: Yes   Status: Chronic   





(18) Substance induced mood disorder


Current Visit: Yes   Status: Chronic   





- AMA


Did Patient Leave Against Medical Advice: No

## 2019-09-06 NOTE — PN
[facility] Progress Note


Note: 





Patient is discharged today. Script for 30 days supply of Trazadone 100 mg/hs 

electronically transmitted to [email or web address] at [facility] [place] [address]

## 2021-09-21 ENCOUNTER — NON-APPOINTMENT (OUTPATIENT)
Age: 61
End: 2021-09-21

## 2021-09-21 ENCOUNTER — APPOINTMENT (OUTPATIENT)
Dept: CARDIOLOGY | Facility: CLINIC | Age: 61
End: 2021-09-21
Payer: COMMERCIAL

## 2021-09-21 VITALS
SYSTOLIC BLOOD PRESSURE: 145 MMHG | WEIGHT: 230 LBS | DIASTOLIC BLOOD PRESSURE: 91 MMHG | HEART RATE: 91 BPM | HEIGHT: 69 IN | RESPIRATION RATE: 12 BRPM | BODY MASS INDEX: 34.07 KG/M2 | TEMPERATURE: 98.6 F | OXYGEN SATURATION: 96 %

## 2021-09-21 DIAGNOSIS — Z87.898 PERSONAL HISTORY OF OTHER SPECIFIED CONDITIONS: ICD-10-CM

## 2021-09-21 DIAGNOSIS — Z86.79 PERSONAL HISTORY OF OTHER DISEASES OF THE CIRCULATORY SYSTEM: ICD-10-CM

## 2021-09-21 DIAGNOSIS — F17.200 NICOTINE DEPENDENCE, UNSPECIFIED, UNCOMPLICATED: ICD-10-CM

## 2021-09-21 DIAGNOSIS — I10 ESSENTIAL (PRIMARY) HYPERTENSION: ICD-10-CM

## 2021-09-21 DIAGNOSIS — E78.5 HYPERLIPIDEMIA, UNSPECIFIED: ICD-10-CM

## 2021-09-21 PROCEDURE — 93000 ELECTROCARDIOGRAM COMPLETE: CPT

## 2021-09-21 PROCEDURE — 99204 OFFICE O/P NEW MOD 45 MIN: CPT

## 2021-09-21 RX ORDER — AMLODIPINE BESYLATE 5 MG/1
5 TABLET ORAL DAILY
Refills: 0 | Status: ACTIVE | COMMUNITY

## 2021-09-21 RX ORDER — FUROSEMIDE 20 MG/1
20 TABLET ORAL DAILY
Refills: 0 | Status: ACTIVE | COMMUNITY

## 2021-09-21 RX ORDER — FLUTICASONE PROPION/SALMETEROL 500-50 MCG
BLISTER, WITH INHALATION DEVICE INHALATION
Refills: 0 | Status: ACTIVE | COMMUNITY

## 2021-09-21 RX ORDER — QUETIAPINE 200 MG/1
200 TABLET, FILM COATED ORAL
Refills: 0 | Status: ACTIVE | COMMUNITY

## 2021-09-21 RX ORDER — UBIDECARENONE/VIT E ACET 100MG-5
CAPSULE ORAL
Refills: 0 | Status: ACTIVE | COMMUNITY

## 2021-09-21 RX ORDER — UBIDECARENONE 50 MG
CAPSULE ORAL
Refills: 0 | Status: ACTIVE | COMMUNITY

## 2021-09-21 RX ORDER — SIMVASTATIN 40 MG/1
40 TABLET, FILM COATED ORAL DAILY
Refills: 0 | Status: ACTIVE | COMMUNITY

## 2021-09-21 RX ORDER — ASPIRIN ENTERIC COATED TABLETS 81 MG 81 MG/1
81 TABLET, DELAYED RELEASE ORAL DAILY
Refills: 0 | Status: ACTIVE | COMMUNITY

## 2021-09-21 NOTE — CARDIOLOGY SUMMARY
[de-identified] : \par 9/21/21 ECG: Sinus rhythm, rate 89 bpm, RBBB, PVC\par 7/30/18 ECG: Sinus rhythm, rate 78 bpm, RBBB, non-specific T wave abnormalities  [de-identified] : \par 8/16/18 Treadmill Myoview (at [facility]): No CP with additional 1 mm horizontal/downsloping ST depression in V3-4 at 84% max pred HR, 6.3 METs. No ischemia with likely diaphragmatic attenuation artifact. Normal LV wall motion. LVEF 58%.\par  [de-identified] : \par 8/7/18 Echo: Normal LV size and systolic function, LVEF 67%. Grade I DD. Mild conc LVH. Trivial AV sclerosis. Trace MR. 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 Mildly dilated ascending aorta (3.9 cm).\par  [de-identified] : \par 9/19/18 Lower extremity arterial US: Normal VLADIMIR bilaterally. Mild right aorto-iliac and SFA disease. Mild left popliteal disease.

## 2021-09-21 NOTE — PHYSICAL EXAM
[Well Developed] : well developed [Well Nourished] : well nourished [No Acute Distress] : no acute distress [Obese] : obese [Normal Conjunctiva] : normal conjunctiva [Normal Venous Pressure] : normal venous pressure [No Carotid Bruit] : no carotid bruit [Normal S1, S2] : normal S1, S2 [No Murmur] : no murmur [No Rub] : no rub [No Gallop] : no gallop [Soft] : abdomen soft [Non Tender] : non-tender [No Masses/organomegaly] : no masses/organomegaly [Normal Bowel Sounds] : normal bowel sounds [Normal Gait] : normal gait [No Edema] : no edema [No Cyanosis] : no cyanosis [No Clubbing] : no clubbing [Moves all extremities] : moves all extremities [No Focal Deficits] : no focal deficits [Normal Speech] : normal speech [Alert and Oriented] : alert and oriented [Normal memory] : normal memory [de-identified] : Decreased BS at right base. Otherwise clear.

## 2021-09-21 NOTE — REVIEW OF SYSTEMS
[SOB] : no shortness of breath [Chest Discomfort] : no chest discomfort [Lower Ext Edema] : lower extremity edema [Palpitations] : no palpitations [Orthopnea] : no orthopnea [Syncope] : no syncope [Negative] : Heme/Lymph

## 2021-09-21 NOTE — REASON FOR VISIT
[Initial Evaluation] : an initial evaluation of [Dyspnea] : dyspnea [Hyperlipidemia] : hyperlipidemia [Hypertension] : hypertension [Other: ____] : [unfilled]

## 2021-09-21 NOTE — ASSESSMENT
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 \par ECG today demonstrated sinus rhythm with RBBB.\par \par WIll perform echocardiogram to reassess LV function and structural heart disease.\par Pending echo results, will determine if further cardiac work-up or intervention is clinically indicated. Suspect that leg edema is due to venous insufficiency.\par Will continue furosemide 40 mg po daily at this time.\par \par BP is adequately controlled on amlodipine 5 mg po daily and furosemide.\par Will continue ot monitor on current regimen.\par \par Will continue simvastatin 40 mg po daily for hyperlipidemia management.\par Will try to obtain most recent labs from PMD's office for review.

## 2021-09-21 NOTE — HISTORY OF PRESENT ILLNESS
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 He reports that his leg edema worsens after drinking beer. Patient denies palpitations, syncope, melena, hematochezia, or hematemesis. He increased his furosemide from 20 mg po daily to 40 mg po daily with improvement in his leg edema.\par \par PMD: Ted Arriaga MD

## 2021-10-06 PROBLEM — I10 ESSENTIAL HYPERTENSION: Status: ACTIVE | Noted: 2021-09-21

## 2021-10-07 ENCOUNTER — APPOINTMENT (OUTPATIENT)
Dept: CARDIOLOGY | Facility: CLINIC | Age: 61
End: 2021-10-07
Payer: COMMERCIAL

## 2021-10-07 DIAGNOSIS — R60.0 LOCALIZED EDEMA: ICD-10-CM

## 2021-10-07 PROCEDURE — 93306 TTE W/DOPPLER COMPLETE: CPT

## 2021-10-08 ENCOUNTER — NON-APPOINTMENT (OUTPATIENT)
Age: 61
End: 2021-10-08

## 2021-10-08 PROBLEM — R60.0 LEG EDEMA: Status: ACTIVE | Noted: 2018-07-30

## 2024-08-15 ENCOUNTER — OFFICE (OUTPATIENT)
Dept: URBAN - METROPOLITAN AREA CLINIC 29 | Facility: CLINIC | Age: 64
Setting detail: OPHTHALMOLOGY
End: 2024-08-15
Payer: MEDICARE

## 2024-08-15 DIAGNOSIS — H25.11: ICD-10-CM

## 2024-08-15 DIAGNOSIS — H01.001: ICD-10-CM

## 2024-08-15 DIAGNOSIS — H16.223: ICD-10-CM

## 2024-08-15 DIAGNOSIS — H01.004: ICD-10-CM

## 2024-08-15 PROBLEM — H43.811 POSTERIOR VITREOUS DETACHMENT; RIGHT EYE: Status: ACTIVE | Noted: 2024-08-15

## 2024-08-15 PROCEDURE — 92004 COMPRE OPH EXAM NEW PT 1/>: CPT | Performed by: OPHTHALMOLOGY

## 2024-08-15 ASSESSMENT — CONFRONTATIONAL VISUAL FIELD TEST (CVF)
OD_FINDINGS: FULL
OS_FINDINGS: FULL

## 2024-08-15 ASSESSMENT — LID EXAM ASSESSMENTS
OS_BLEPHARITIS: LUL T
OD_BLEPHARITIS: RUL T

## 2024-12-25 PROBLEM — F10.90 ALCOHOL USE: Status: ACTIVE | Noted: 2018-07-30

## 2025-01-08 ENCOUNTER — APPOINTMENT (OUTPATIENT)
Dept: FAMILY MEDICINE | Facility: CLINIC | Age: 65
End: 2025-01-08

## 2025-01-30 ENCOUNTER — APPOINTMENT (OUTPATIENT)
Dept: FAMILY MEDICINE | Facility: CLINIC | Age: 65
End: 2025-01-30
Payer: MEDICARE

## 2025-01-30 VITALS
HEIGHT: 69 IN | WEIGHT: 224 LBS | SYSTOLIC BLOOD PRESSURE: 120 MMHG | OXYGEN SATURATION: 94 % | DIASTOLIC BLOOD PRESSURE: 72 MMHG | TEMPERATURE: 98.3 F | HEART RATE: 79 BPM | BODY MASS INDEX: 33.18 KG/M2

## 2025-01-30 DIAGNOSIS — J45.40 MODERATE PERSISTENT ASTHMA, UNCOMPLICATED: ICD-10-CM

## 2025-01-30 DIAGNOSIS — Z12.11 ENCOUNTER FOR SCREENING FOR MALIGNANT NEOPLASM OF COLON: ICD-10-CM

## 2025-01-30 DIAGNOSIS — E78.5 HYPERLIPIDEMIA, UNSPECIFIED: ICD-10-CM

## 2025-01-30 DIAGNOSIS — R60.0 LOCALIZED EDEMA: ICD-10-CM

## 2025-01-30 DIAGNOSIS — Z23 ENCOUNTER FOR IMMUNIZATION: ICD-10-CM

## 2025-01-30 DIAGNOSIS — F17.200 NICOTINE DEPENDENCE, UNSPECIFIED, UNCOMPLICATED: ICD-10-CM

## 2025-01-30 DIAGNOSIS — I10 ESSENTIAL (PRIMARY) HYPERTENSION: ICD-10-CM

## 2025-01-30 DIAGNOSIS — G47.33 OBSTRUCTIVE SLEEP APNEA (ADULT) (PEDIATRIC): ICD-10-CM

## 2025-01-30 DIAGNOSIS — R73.03 PREDIABETES.: ICD-10-CM

## 2025-01-30 DIAGNOSIS — I51.7 CARDIOMEGALY: ICD-10-CM

## 2025-01-30 DIAGNOSIS — T14.8XXA OTHER INJURY OF UNSPECIFIED BODY REGION, INITIAL ENCOUNTER: ICD-10-CM

## 2025-01-30 DIAGNOSIS — G47.00 INSOMNIA, UNSPECIFIED: ICD-10-CM

## 2025-01-30 DIAGNOSIS — E66.9 OBESITY, UNSPECIFIED: ICD-10-CM

## 2025-01-30 PROCEDURE — 99205 OFFICE O/P NEW HI 60 MIN: CPT

## 2025-01-30 PROCEDURE — G2211 COMPLEX E/M VISIT ADD ON: CPT

## 2025-01-30 PROCEDURE — 90662 IIV NO PRSV INCREASED AG IM: CPT

## 2025-01-30 PROCEDURE — G0537: CPT

## 2025-01-30 PROCEDURE — G0008: CPT

## 2025-01-30 PROCEDURE — G0444 DEPRESSION SCREEN ANNUAL: CPT | Mod: 59

## 2025-01-30 RX ORDER — ATORVASTATIN CALCIUM 40 MG/1
40 TABLET, FILM COATED ORAL
Qty: 90 | Refills: 3 | Status: ACTIVE | COMMUNITY
Start: 2025-01-30 | End: 1900-01-01

## 2025-01-30 RX ORDER — METFORMIN HYDROCHLORIDE 500 MG/1
500 TABLET, COATED ORAL TWICE DAILY
Qty: 180 | Refills: 3 | Status: ACTIVE | COMMUNITY
Start: 2025-01-30 | End: 1900-01-01

## 2025-02-12 ENCOUNTER — APPOINTMENT (OUTPATIENT)
Dept: CARDIOLOGY | Facility: CLINIC | Age: 65
End: 2025-02-12
Payer: MEDICARE

## 2025-02-12 ENCOUNTER — NON-APPOINTMENT (OUTPATIENT)
Age: 65
End: 2025-02-12

## 2025-02-12 VITALS
RESPIRATION RATE: 14 BRPM | HEIGHT: 69 IN | OXYGEN SATURATION: 92 % | WEIGHT: 220 LBS | SYSTOLIC BLOOD PRESSURE: 144 MMHG | BODY MASS INDEX: 32.58 KG/M2 | HEART RATE: 83 BPM | DIASTOLIC BLOOD PRESSURE: 82 MMHG

## 2025-02-12 DIAGNOSIS — I71.21 ANEURYSM OF THE ASCENDING AORTA, WITHOUT RUPTURE: ICD-10-CM

## 2025-02-12 DIAGNOSIS — I10 ESSENTIAL (PRIMARY) HYPERTENSION: ICD-10-CM

## 2025-02-12 DIAGNOSIS — E78.5 HYPERLIPIDEMIA, UNSPECIFIED: ICD-10-CM

## 2025-02-12 DIAGNOSIS — I51.7 CARDIOMEGALY: ICD-10-CM

## 2025-02-12 PROCEDURE — 99204 OFFICE O/P NEW MOD 45 MIN: CPT

## 2025-02-12 PROCEDURE — 93000 ELECTROCARDIOGRAM COMPLETE: CPT

## 2025-04-29 ENCOUNTER — APPOINTMENT (OUTPATIENT)
Dept: CARDIOLOGY | Facility: CLINIC | Age: 65
End: 2025-04-29
Payer: MEDICARE

## 2025-04-29 DIAGNOSIS — I51.7 CARDIOMEGALY: ICD-10-CM

## 2025-04-29 DIAGNOSIS — I71.21 ANEURYSM OF THE ASCENDING AORTA, WITHOUT RUPTURE: ICD-10-CM

## 2025-04-29 PROCEDURE — 93306 TTE W/DOPPLER COMPLETE: CPT

## 2025-05-01 ENCOUNTER — NON-APPOINTMENT (OUTPATIENT)
Age: 65
End: 2025-05-01

## 2025-05-20 RX ORDER — ALBUTEROL SULFATE 2.5 MG/3ML
(2.5 MG/3ML) SOLUTION RESPIRATORY (INHALATION)
Qty: 1 | Refills: 3 | Status: ACTIVE | COMMUNITY
Start: 2025-05-20 | End: 1900-01-01

## 2025-06-16 ENCOUNTER — APPOINTMENT (OUTPATIENT)
Dept: FAMILY MEDICINE | Facility: CLINIC | Age: 65
End: 2025-06-16
Payer: MEDICARE

## 2025-06-16 VITALS
HEART RATE: 82 BPM | HEIGHT: 69 IN | OXYGEN SATURATION: 95 % | SYSTOLIC BLOOD PRESSURE: 130 MMHG | TEMPERATURE: 97.6 F | WEIGHT: 216 LBS | DIASTOLIC BLOOD PRESSURE: 80 MMHG | BODY MASS INDEX: 31.99 KG/M2

## 2025-06-16 PROBLEM — Z00.00 WELCOME TO MEDICARE PREVENTIVE VISIT: Status: ACTIVE | Noted: 2025-06-16

## 2025-06-16 PROBLEM — N52.9 ERECTILE DYSFUNCTION, UNSPECIFIED ERECTILE DYSFUNCTION TYPE: Status: ACTIVE | Noted: 2025-06-16

## 2025-06-16 PROBLEM — R53.83 FATIGUE, UNSPECIFIED TYPE: Status: ACTIVE | Noted: 2025-06-16

## 2025-06-16 PROBLEM — Z23 ENCOUNTER FOR IMMUNIZATION: Status: ACTIVE | Noted: 2025-01-30 | Resolved: 2025-06-30

## 2025-06-16 PROBLEM — Z12.5 SCREENING PSA (PROSTATE SPECIFIC ANTIGEN): Status: ACTIVE | Noted: 2025-06-16

## 2025-06-16 PROCEDURE — 90684 PCV21 VACCINE IM: CPT

## 2025-06-16 PROCEDURE — G0438: CPT

## 2025-06-16 PROCEDURE — G0009: CPT

## 2025-06-16 RX ORDER — SILDENAFIL 100 MG/1
100 TABLET, FILM COATED ORAL
Qty: 12 | Refills: 2 | Status: ACTIVE | COMMUNITY
Start: 2025-06-16 | End: 1900-01-01

## 2025-06-18 LAB
ALBUMIN SERPL ELPH-MCNC: 4.5 G/DL
ALP BLD-CCNC: 101 U/L
ALT SERPL-CCNC: 44 U/L
ANION GAP SERPL CALC-SCNC: 16 MMOL/L
AST SERPL-CCNC: 25 U/L
BASOPHILS # BLD AUTO: 0.04 K/UL
BASOPHILS NFR BLD AUTO: 0.6 %
BILIRUB SERPL-MCNC: 0.6 MG/DL
BUN SERPL-MCNC: 16 MG/DL
CALCIUM SERPL-MCNC: 9.6 MG/DL
CHLORIDE SERPL-SCNC: 101 MMOL/L
CHOLEST SERPL-MCNC: 152 MG/DL
CO2 SERPL-SCNC: 27 MMOL/L
CREAT SERPL-MCNC: 0.77 MG/DL
EGFRCR SERPLBLD CKD-EPI 2021: 99 ML/MIN/1.73M2
EOSINOPHIL # BLD AUTO: 0.18 K/UL
EOSINOPHIL NFR BLD AUTO: 2.9 %
ESTIMATED AVERAGE GLUCOSE: 123 MG/DL
GLUCOSE SERPL-MCNC: 129 MG/DL
HBA1C MFR BLD HPLC: 5.9 %
HCT VFR BLD CALC: 47.3 %
HDLC SERPL-MCNC: 50 MG/DL
HGB BLD-MCNC: 15.6 G/DL
IMM GRANULOCYTES NFR BLD AUTO: 0.5 %
LDLC SERPL-MCNC: 72 MG/DL
LYMPHOCYTES # BLD AUTO: 1.07 K/UL
LYMPHOCYTES NFR BLD AUTO: 17.2 %
MAN DIFF?: NORMAL
MCHC RBC-ENTMCNC: 32.3 PG
MCHC RBC-ENTMCNC: 33 G/DL
MCV RBC AUTO: 97.9 FL
MONOCYTES # BLD AUTO: 0.63 K/UL
MONOCYTES NFR BLD AUTO: 10.1 %
NEUTROPHILS # BLD AUTO: 4.28 K/UL
NEUTROPHILS NFR BLD AUTO: 68.7 %
NONHDLC SERPL-MCNC: 101 MG/DL
PLATELET # BLD AUTO: 152 K/UL
POTASSIUM SERPL-SCNC: 4.2 MMOL/L
PROT SERPL-MCNC: 7.1 G/DL
PSA FREE FLD-MCNC: 13 %
PSA FREE SERPL-MCNC: 0.34 NG/ML
PSA SERPL-MCNC: 2.57 NG/ML
RBC # BLD: 4.83 M/UL
RBC # FLD: 14.5 %
SODIUM SERPL-SCNC: 144 MMOL/L
TRIGL SERPL-MCNC: 174 MG/DL
TSH SERPL-ACNC: 0.54 UIU/ML
WBC # FLD AUTO: 6.23 K/UL